# Patient Record
(demographics unavailable — no encounter records)

---

## 2018-01-01 NOTE — ED PDOC
HPI: Skin/Bite Injury


Time Seen by Provider: 12/03/18 11:17


Chief Complaint (Nursing): Abnormal Skin Integrity


Chief Complaint (Provider): Rash


History Per: Family (mother)


History/Exam Limitations: no limitations


Onset/Duration Of Symptoms: Hrs


Current Symptoms Are (Timing): Still Present


Additional Complaint(s): 


1 month and 4 days old female was brought to the ED by mother for an evaluation 

of rash on the chest, back and face onset today morning. Patient was born by C-

section due to preeclampsia at 39 weeks with no complications post-delivery. As 

per mom, patient does not have fever, runny nose, cough or vomiting. 


PMD: Marc Joseph











Past Medical History


Reviewed: Historical Data, Nursing Documentation, Vital Signs


Vital Signs: 





                                Last Vital Signs











Temp  99.0 F   12/03/18 11:06


 


Pulse  177 H  12/03/18 11:06


 


Resp  30   12/03/18 11:06


 


BP      


 


Pulse Ox  94 L  12/03/18 11:06














- Medical History


PMH: No Chronic Diseases





- Surgical History


Surgical History: No Surg Hx





- Family History


Family History: States: Unknown Family Hx





- Home Medications


Home Medications: 


                                Ambulatory Orders











 Medication  Instructions  Recorded


 


No Known Home Med  10/29/18














- Allergies


Allergies/Adverse Reactions: 


                                    Allergies











Allergy/AdvReac Type Severity Reaction Status Date / Time


 


No Known Allergies Allergy   Verified 12/03/18 11:23














Review of Systems


ROS Statement: Except As Marked, All Systems Reviewed And Found Negative





Physical Exam





- Reviewed


Nursing Documentation Reviewed: Yes


Vital Signs Reviewed: Yes





- Physical Exam


Appears: Positive for: Non-toxic, No Acute Distress


Head Exam: Positive for: ATRAUMATIC, NORMOCEPHALIC.  Negative for: NORMAL 

INSPECTION


Skin: Positive for: Warm (moist), Rash (erythematous maculopapular rash on upper

chest, upper back and check bilaterally, no involvement in palms of hands and 

legs )


Eye Exam: Positive for: EOMI, Normal appearance, PERRL


ENT: Positive for: Other (mucus moist, no lesion or spots)


Cardiovascular/Chest: Positive for: Regular Rate, Rhythm.  Negative for: Murmur


Respiratory: Positive for: Normal Breath Sounds.  Negative for: Decreased Breath

Sounds, Wheezing, Respiratory Distress


Gastrointestinal/Abdominal: Positive for: Normal Exam, Soft.  Negative for: 

Tenderness, Guarding, Rebound


Neurologic/Psych: Positive for: Alert, Other (patient acting appropriate for 

age)





- ECG


O2 Sat by Pulse Oximetry: 94 (RA)


Pulse Ox Interpretation: Normal





Medical Decision Making


Medical Decision Making: 


Time: 1117





Patient was evaluated by Dr. Canas, pediatrician on call, who states the 

rash is from saliva dripped from the patient's mouth.





Clinical Impression: Dermatitis


Upon provider reevaluation patient is requires no further treatment in the ED at

this time. Patient will be discharged home. Counseling was provided and all ques

tions were answered regarding diagnosis to the mother. There is agreement to 

discharge plan. Return if symptoms persist or worsen.


---------------------------------------------------------------

----------------------


Scribe Attestation:


Documented by Maude Oropeza, acting as a scribe for Rodney Agarwal MD.





Provider Scribe Attestation:


All medical record entries made by the Scribe were at my direction and 

personally dictated by me. I have reviewed the chart and agree that the record 

accurately reflects my personal performance of the history, physical exam, medi

shaggy decision making, and the department course for this patient. I have also 

personally directed, reviewed, and agree with the discharge instructions and 

disposition.








Disposition





- Clinical Impression


Clinical Impression: 


 Dermatitis








- Patient ED Disposition


Is Patient to be Admitted: No





- Disposition


Disposition: Routine/Home


Disposition Time: 12:23


Condition: FAIR


Instructions:  Skin Rash


Forms:  [employer] (English)


Print Language: Sinhala